# Patient Record
Sex: FEMALE | Race: WHITE | NOT HISPANIC OR LATINO | URBAN - METROPOLITAN AREA
[De-identification: names, ages, dates, MRNs, and addresses within clinical notes are randomized per-mention and may not be internally consistent; named-entity substitution may affect disease eponyms.]

---

## 2021-08-08 ENCOUNTER — EMERGENCY (EMERGENCY)
Facility: HOSPITAL | Age: 71
LOS: 1 days | End: 2021-08-08
Admitting: EMERGENCY MEDICINE
Payer: MEDICARE

## 2021-08-08 PROCEDURE — 99284 EMERGENCY DEPT VISIT MOD MDM: CPT

## 2021-08-13 ENCOUNTER — NON-APPOINTMENT (OUTPATIENT)
Age: 71
End: 2021-08-13

## 2021-08-13 ENCOUNTER — APPOINTMENT (OUTPATIENT)
Dept: FAMILY MEDICINE | Facility: CLINIC | Age: 71
End: 2021-08-13
Payer: MEDICARE

## 2021-08-13 VITALS
WEIGHT: 226 LBS | DIASTOLIC BLOOD PRESSURE: 73 MMHG | BODY MASS INDEX: 38.58 KG/M2 | HEIGHT: 64 IN | RESPIRATION RATE: 16 BRPM | OXYGEN SATURATION: 97 % | SYSTOLIC BLOOD PRESSURE: 118 MMHG | HEART RATE: 87 BPM

## 2021-08-13 DIAGNOSIS — L40.9 PSORIASIS, UNSPECIFIED: ICD-10-CM
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
## 2021-08-13 DIAGNOSIS — Z85.3 PERSONAL HISTORY OF MALIGNANT NEOPLASM OF BREAST: ICD-10-CM

## 2021-08-13 DIAGNOSIS — E66.9 OBESITY, UNSPECIFIED: ICD-10-CM

## 2021-08-13 DIAGNOSIS — M85.80 OTHER SPECIFIED DISORDERS OF BONE DENSITY AND STRUCTURE, UNSPECIFIED SITE: ICD-10-CM

## 2021-08-13 PROBLEM — Z00.00 ENCOUNTER FOR PREVENTIVE HEALTH EXAMINATION: Status: ACTIVE | Noted: 2021-08-13

## 2021-08-13 PROCEDURE — 99214 OFFICE O/P EST MOD 30 MIN: CPT

## 2021-08-13 RX ORDER — CALCIPOTRIENE 50 UG/G
0.01 CREAM TOPICAL DAILY
Qty: 1 | Refills: 0 | Status: ACTIVE | COMMUNITY
Start: 2021-08-13 | End: 1900-01-01

## 2021-08-13 NOTE — HISTORY OF PRESENT ILLNESS
[FreeTextEntry1] : 70-year-old female seen in the ER, [de-identified] : 70-year-old female working in her yard is stung by yellow jacket on her left third finger.  Swelling and evidence of cellulitis.  Emergency room IV antibiotics and returns for follow-up visit

## 2022-02-28 ENCOUNTER — TRANSCRIPTION ENCOUNTER (OUTPATIENT)
Age: 72
End: 2022-02-28

## 2024-06-17 ENCOUNTER — APPOINTMENT (OUTPATIENT)
Dept: FAMILY MEDICINE | Facility: CLINIC | Age: 74
End: 2024-06-17
Payer: MEDICARE

## 2024-06-17 VITALS
BODY MASS INDEX: 38.58 KG/M2 | SYSTOLIC BLOOD PRESSURE: 142 MMHG | HEIGHT: 64 IN | TEMPERATURE: 97.8 F | HEART RATE: 76 BPM | OXYGEN SATURATION: 97 % | RESPIRATION RATE: 16 BRPM | WEIGHT: 226 LBS | DIASTOLIC BLOOD PRESSURE: 80 MMHG

## 2024-06-17 DIAGNOSIS — L03.115 CELLULITIS OF RIGHT LOWER LIMB: ICD-10-CM

## 2024-06-17 DIAGNOSIS — T63.461A TOXIC EFFECT OF VENOM OF WASPS, ACCIDENTAL (UNINTENTIONAL), INITIAL ENCOUNTER: ICD-10-CM

## 2024-06-17 PROCEDURE — 99214 OFFICE O/P EST MOD 30 MIN: CPT

## 2024-06-17 RX ORDER — CEPHALEXIN 500 MG/1
500 TABLET ORAL 4 TIMES DAILY
Qty: 40 | Refills: 0 | Status: ACTIVE | COMMUNITY
Start: 2024-06-17 | End: 1900-01-01

## 2024-06-17 NOTE — REVIEW OF SYSTEMS
[Skin Rash] : skin rash [Negative] : Respiratory [Chest Pain] : no chest pain [Palpitations] : no palpitations [FreeTextEntry2] : except as stated in cc [de-identified] : as stated in cc

## 2024-06-17 NOTE — PLAN
[FreeTextEntry1] : placed on cephalaxin  area marked  instructed patient and  that if rash or pain increase to go to ER  area marked in office

## 2024-06-17 NOTE — HISTORY OF PRESENT ILLNESS
[FreeTextEntry8] : states  wasp sting saturday  + erythematous slightly painful rash  lateral aspect of right knee

## 2024-06-17 NOTE — PHYSICAL EXAM
[Normal] : normal rate, regular rhythm, normal S1 and S2 and no murmur heard [No Carotid Bruits] : no carotid bruits [No Edema] : there was no peripheral edema [de-identified] : + rash  warm to touch  slightly painful about  11x 9 cm area marked today in office

## 2025-07-17 ENCOUNTER — RX ONLY (RX ONLY)
Age: 75
End: 2025-07-17

## 2025-07-17 ENCOUNTER — OFFICE (OUTPATIENT)
Dept: URBAN - METROPOLITAN AREA CLINIC 105 | Facility: CLINIC | Age: 75
Setting detail: OPHTHALMOLOGY
End: 2025-07-17
Payer: MEDICARE

## 2025-07-17 DIAGNOSIS — H01.004: ICD-10-CM

## 2025-07-17 DIAGNOSIS — H01.002: ICD-10-CM

## 2025-07-17 DIAGNOSIS — H01.005: ICD-10-CM

## 2025-07-17 DIAGNOSIS — H01.001: ICD-10-CM

## 2025-07-17 PROBLEM — H04.203 EPIPHORA, LACRIMAL GLAND, UNSPECIFIED CAUSE; BOTH EYES: Status: ACTIVE | Noted: 2025-07-17

## 2025-07-17 PROBLEM — B88.0 ACARIASIS, INFESTATION OF MITES AND OR TICS: Status: ACTIVE | Noted: 2025-07-17

## 2025-07-17 PROCEDURE — 99203 OFFICE O/P NEW LOW 30 MIN: CPT | Performed by: OPTOMETRIST

## 2025-07-17 ASSESSMENT — CONFRONTATIONAL VISUAL FIELD TEST (CVF)
OD_FINDINGS: FULL
OS_FINDINGS: FULL

## 2025-07-17 ASSESSMENT — REFRACTION_AUTOREFRACTION
OD_SPHERE: -1.75
OS_SPHERE: -1.00
OD_CYLINDER: -0.75
OS_AXIS: 034
OD_AXIS: 028
OS_CYLINDER: -1.00

## 2025-07-17 ASSESSMENT — REFRACTION_CURRENTRX
OD_OVR_VA: 20/
OS_AXIS: 034
OD_SPHERE: -2.00
OS_VPRISM_DIRECTION: PROGS
OD_CYLINDER: SPH
OS_CYLINDER: -0.75
OD_ADD: +2.50
OS_ADD: +2.50
OS_OVR_VA: 20/
OS_SPHERE: -2.00
OD_VPRISM_DIRECTION: PROGS

## 2025-07-17 ASSESSMENT — KERATOMETRY
OD_AXISANGLE_DEGREES: 095
OD_K2POWER_DIOPTERS: 42.75
OS_K1POWER_DIOPTERS: 42.25
OD_K1POWER_DIOPTERS: 42.00
OS_K2POWER_DIOPTERS: 43.50
OS_AXISANGLE_DEGREES: 119

## 2025-07-17 ASSESSMENT — TONOMETRY: OD_IOP_MMHG: 18

## 2025-07-17 ASSESSMENT — PUNCTA - ASSESSMENT
OS_PUNCTA: SCLEROSED
OD_PUNCTA: SCLEROSED

## 2025-07-17 ASSESSMENT — LID EXAM ASSESSMENTS
OS_BLEPHARITIS: LLL LUL 1+ 2+
OD_BLEPHARITIS: RLL RUL 1+ 2+

## 2025-07-17 ASSESSMENT — VISUAL ACUITY
OD_BCVA: 20/20
OS_BCVA: 20/20